# Patient Record
Sex: MALE | Race: WHITE | NOT HISPANIC OR LATINO | Employment: OTHER | URBAN - METROPOLITAN AREA
[De-identification: names, ages, dates, MRNs, and addresses within clinical notes are randomized per-mention and may not be internally consistent; named-entity substitution may affect disease eponyms.]

---

## 2018-07-10 PROBLEM — R04.0 ANTERIOR EPISTAXIS: Status: ACTIVE | Noted: 2018-07-10

## 2023-11-03 PROBLEM — I10 PRIMARY HYPERTENSION: Status: ACTIVE | Noted: 2023-11-03

## 2023-11-03 PROBLEM — I1A.0 RESISTANT HYPERTENSION: Status: ACTIVE | Noted: 2023-11-03

## 2023-11-28 ENCOUNTER — TELEPHONE (OUTPATIENT)
Dept: HEALTH INFORMATION MANAGEMENT | Facility: OTHER | Age: 48
End: 2023-11-28

## 2024-01-16 ENCOUNTER — OFFICE VISIT (OUTPATIENT)
Dept: VASCULAR LAB | Facility: MEDICAL CENTER | Age: 49
End: 2024-01-16
Attending: FAMILY MEDICINE
Payer: COMMERCIAL

## 2024-01-16 VITALS
WEIGHT: 236 LBS | HEART RATE: 77 BPM | BODY MASS INDEX: 35.77 KG/M2 | SYSTOLIC BLOOD PRESSURE: 120 MMHG | DIASTOLIC BLOOD PRESSURE: 79 MMHG | HEIGHT: 68 IN

## 2024-01-16 DIAGNOSIS — E78.49 OTHER HYPERLIPIDEMIA: ICD-10-CM

## 2024-01-16 DIAGNOSIS — I1A.0 RESISTANT HYPERTENSION: ICD-10-CM

## 2024-01-16 DIAGNOSIS — I65.23 CAROTID ATHEROSCLEROSIS, BILATERAL: ICD-10-CM

## 2024-01-16 DIAGNOSIS — I67.9 SMALL VESSEL DISEASE, CEREBROVASCULAR: Chronic | ICD-10-CM

## 2024-01-16 DIAGNOSIS — I63.81 LACUNAR STROKE (HCC): Chronic | ICD-10-CM

## 2024-01-16 DIAGNOSIS — K76.0 FATTY LIVER: ICD-10-CM

## 2024-01-16 PROCEDURE — 99204 OFFICE O/P NEW MOD 45 MIN: CPT | Performed by: FAMILY MEDICINE

## 2024-01-16 PROCEDURE — 99212 OFFICE O/P EST SF 10 MIN: CPT

## 2024-01-16 PROCEDURE — 3074F SYST BP LT 130 MM HG: CPT | Performed by: FAMILY MEDICINE

## 2024-01-16 PROCEDURE — 3078F DIAST BP <80 MM HG: CPT | Performed by: FAMILY MEDICINE

## 2024-01-16 RX ORDER — LOSARTAN POTASSIUM 100 MG/1
TABLET ORAL
COMMUNITY
Start: 2023-12-12 | End: 2024-01-16

## 2024-01-16 RX ORDER — TELMISARTAN 80 MG/1
80 TABLET ORAL
Qty: 90 TABLET | Refills: 3 | Status: SHIPPED | OUTPATIENT
Start: 2024-01-16

## 2024-01-16 RX ORDER — HYDRALAZINE HYDROCHLORIDE 25 MG/1
25 TABLET, FILM COATED ORAL 3 TIMES DAILY
Qty: 90 TABLET | Refills: 5 | Status: SHIPPED | OUTPATIENT
Start: 2024-01-16 | End: 2024-03-07

## 2024-01-16 RX ORDER — SPIRONOLACTONE 25 MG/1
25 TABLET ORAL DAILY
Qty: 30 TABLET | Refills: 3 | Status: SHIPPED | OUTPATIENT
Start: 2024-01-16

## 2024-01-16 RX ORDER — ROSUVASTATIN CALCIUM 20 MG/1
20 TABLET, COATED ORAL EVERY EVENING
Qty: 90 TABLET | Refills: 3 | Status: SHIPPED | OUTPATIENT
Start: 2024-01-16

## 2024-01-16 ASSESSMENT — ENCOUNTER SYMPTOMS
ORTHOPNEA: 0
CHILLS: 0
WHEEZING: 0
COUGH: 0
CLAUDICATION: 0
FEVER: 0
PND: 0
SHORTNESS OF BREATH: 0
HEMOPTYSIS: 0
PALPITATIONS: 0
SPUTUM PRODUCTION: 0

## 2024-01-16 ASSESSMENT — FIBROSIS 4 INDEX: FIB4 SCORE: 1.45

## 2024-01-16 NOTE — PROGRESS NOTES
RESISTANT HTN CLINIC - INITIAL EVALUATION   24    Chacho Salas is a male seen for eval/mgmt of HTN, est 2024  Chacho Salas is initially referred by Susie Chavez MD (PCP)    Subjective      HTN:  Home BP los/100s - using good machine   Adherence/tolerance to current HTN meds: >90% adherence, tolerating all meds as Rx'd  Initial visit hx:  seen by PCP 2023 and noted to have ongoing HTN despite current tx, here to review additional options.  No current sx. Has been seen routinely with med titration over last 2 months.  Subsequently seen at [facility] by APRN on 23 who initiated a secondary HTN w/u including labs and KATIE duplex.  Requested ETT and CACS.  Had hydralazine reduced to 25mg TID and continued on all other meds.     LIFESTYLE MGMT:  Weight Change: stable   Body mass index is 35.88 kg/m².   Wt Readings from Last 3 Encounters:   24 107 kg (236 lb)   23 108 kg (238 lb 1.6 oz)   11/15/23 107 kg (235 lb)      Diet pattern: common adult  Daily salt intake estimate:  Moderate     EtOH: see SHx for details - prior heavy drinking, now 2d/week with binge >3-4 drinks/setting   Exercise habits: moderate regular exercise program at work   Perceived barriers to care: none   PERTINENT HTN PMHX  Age at HTN dx: 12  Past HTN medications: multiple changes over time   HTN crises:  2017 - acute onset lacunar CVA with HTN emergency   Interfering substances/contributing factors:  None    Hyperlipidemia:  Stable, no current concerns  Current treatment: lifestyle changes  and Moderate intensity simva 20mg     Antiplatelet/anticoagulation: Yes, Details: ASA , no bleeding noted     Type 2 DM: No     CKD: No     Sleeping disorder/DANNIELLE: Yes, Details: uncontrolled, unable to tolerated CPAP. No longer using.  Reports prior severe, still snoring and daytime somnolence, STOP BANG >4pts      Hypothyroidism: No     Patient Active Problem List    Diagnosis Date Noted    Fatty liver 2024     "Small vessel disease, cerebrovascular 01/16/2024    Carotid atherosclerosis, bilateral 01/16/2024    Primary hypertension 11/03/2023    Resistant hypertension 11/03/2023    Anterior epistaxis 07/10/2018    Lacunar stroke (HCC) 03/29/2016    Hypertensive emergency 03/17/2016     Past Surgical History:   Procedure Laterality Date    ACHILLES TENDON REPAIR      KNEE ARTHROSCOPY         Current Outpatient Medications:     telmisartan, 80 mg, Oral, QHS    spironolactone, 25 mg, Oral, DAILY    hydrALAZINE, 25 mg, Oral, TID    rosuvastatin, 20 mg, Oral, Q EVENING    amLODIPine, 5 mg, Oral, DAILY, Taking    hydroCHLOROthiazide, 25 mg, Oral, DAILY, Taking    aspirin EC, 81 mg, Oral, DAILY, Taking   Ace inhibitors and Chlorthalidone    History reviewed. No pertinent family history.  Social History     Tobacco Use    Smoking status: Never    Smokeless tobacco: Never   Vaping Use    Vaping Use: Never used   Substance Use Topics    Alcohol use: Not Currently     Comment: 3-4 days a week    Drug use: Yes     Comment: THC      Review of Systems   Constitutional:  Negative for chills, fever and malaise/fatigue.   Respiratory:  Negative for cough, hemoptysis, sputum production, shortness of breath and wheezing.    Cardiovascular:  Negative for chest pain, palpitations, orthopnea, claudication, leg swelling and PND.   Neurological:  Negative for dizziness, tremors, focal weakness, seizures, weakness and headaches.       Objective    Vitals:    01/16/24 0905   BP: 120/79   BP Location: Left arm   Patient Position: Sitting   BP Cuff Size: Large adult   Pulse: 77   Weight: 107 kg (236 lb)   Height: 1.727 m (5' 8\")         Body mass index is 35.88 kg/m².  BP Readings from Last 5 Encounters:   01/16/24 120/79   12/13/23 124/72   12/12/23 127/75   11/29/23 (!) 160/108   11/15/23 (!) 168/100      Physical Exam  Vitals reviewed.   Constitutional:       General: He is not in acute distress.     Appearance: He is well-developed. He is not " "diaphoretic.   HENT:      Head: Normocephalic and atraumatic.   Neck:      Thyroid: No thyromegaly.      Vascular: No JVD.   Cardiovascular:      Rate and Rhythm: Normal rate and regular rhythm.      Pulses: Normal pulses.      Heart sounds: No murmur heard.  Pulmonary:      Effort: No respiratory distress.      Breath sounds: Normal breath sounds. No wheezing or rales.   Musculoskeletal:         General: No swelling or tenderness.      Right lower leg: No edema.      Left lower leg: No edema.   Neurological:      General: No focal deficit present.      Mental Status: He is oriented to person, place, and time.      Cranial Nerves: No cranial nerve deficit.      Coordination: Coordination normal.   Psychiatric:         Mood and Affect: Mood normal.         Behavior: Behavior normal.        Accessory Clinical Findings:     Lab Results   Component Value Date    CHOLSTRLTOT 163 11/28/2023    LDL 99 11/28/2023    HDL 40 11/28/2023    TRIGLYCERIDE 120 11/28/2023      No results found for: \"LIPOPROTA\"   No results found for: \"APOB\"           Lab Results   Component Value Date    SODIUM 137 12/12/2023    POTASSIUM 3.8 12/12/2023    CHLORIDE 102 12/12/2023    CO2 24 12/12/2023    GLUCOSE 88 12/12/2023    BUN 19 12/12/2023    CREATININE 1.0 12/12/2023       Latest Reference Range & Units 09/21/23 08:11   Cortisol ug/dL 6.6   TSH 0.47 - 4.68 uIU/mL 2.12   Free T-4 0.78 - 2.19 ng/dL 1.37            No results found for: \"MICROALBCALC\", \"MALBCRT\", \"MALBEXCR\", \"APHZUK46\", \"MICROALBUR\", \"MICRALB\", \"UMICROALBUM\", \"MICROALBTIM\"     VASCULAR IMAGING:    Carotid 2016  Minimal atherosclerotic disease is noted bilaterally, resulting in no hemodynamically significant carotid stenosis by sonographic criteria.     CT a/p 3/2023   Fatty liver.     CTA neck/head 5/2023  There is minimal atherosclerotic calcification involving carotid bulbs bilaterally.   Otherwise unremarkable CT angiogram of the neck, and [place].   Incidental note " is made of irregularity of vertebral endplates and disc space narrowing, from C5 through C7, which could represent a manifestation of previous trauma or infection.  Correlation with clinical history is suggested.    MR brain 6/2023  Remote lacunar infarction left basal ganglia.   5 mm focus of T2 hyperintensity involving right frontal deep white matter is most compatible with focal demyelination resulting from chronic microvascular atherosclerotic disease.    KATIE duplex 1/2024  Normal renal arterial study without evidence for renal artery stenosis.     Echo 1/2024        MEDICAL DECISION-MAKING:  TODAY'S ASSESSMENT / STATUS / PLAN:  1. Resistant hypertension  ALDOSTERONE    RENIN PLASMA    METANEPHRINES PLASMA    telmisartan (MICARDIS) 80 MG Tab    spironolactone (ALDACTONE) 25 MG Tab    hydrALAZINE (APRESOLINE) 25 MG Tab      2. Lacunar stroke (HCC)      per MR 6/2023 = Left basal ganglia      3. Small vessel disease, cerebrovascular      per MR 6/2023      4. Carotid atherosclerosis, bilateral        5. Fatty liver        6. Other hyperlipidemia  rosuvastatin (CRESTOR) 20 MG Tab         Patient Type: Primary Prevention, could consider secondary due to lacunar infarct    1. BLOOD PRESSURE CONTROL   Qualifies as Resistant HTN (RH):  No, not on optimized, max-dosed or max-tolerated meds from 3 classes   Office BP Goal ACC/AHA (2017) goal <130/80  Home BP at goal:  no  Office BP at goal:  no  24h ABPM:  not ordered to date   RDN candidate? YES  - stage 2, early age onset, has secondary w/u initiate   - recommend to allow our HTN specialty clinic to manage BP med adjustments to avoid excessive change and/or confusion with tx plan   Plan:   Monitoring:   - start/continue home BP monitoring, reviewed correct technique:  Yes   - order 24h ABPM: UNDECIDED  - monitor lytes/gfr routinely   - advised that stabilizing BP may require multiple med changes and close monitoring over the next several months, reviewed that initially  there will be additional imaging and labs and the possibility of adverse drug rxn's and variability of his BP and HR until we have things stabilized.  Advised to contact our office as needed for questions or concerns.      2. WORK UP FOR SECONDARY CAUSES OF RH:  Obstructive Sleep Apnea:  uncontrolled, failed CPAP  - advised of importance for tx, suggested f/u with PCP or sleep MD to review INSPIRE implant   - tx will modestly lower SBP and should be part of tx plan   Plan: see PCP or sleep MD for further eval     Renal parenchymal disease: Excluded  Renovascular HTN: Excluded    Primary Aldosteronism: pending labs, had severe hypoK+ response to higher potency thiazide, may have evidence for PA,    Plan: get labs to calc ARR then consider CT adrenal, start spirono after labs     Thyroid Function: Excluded  Pheochromocytoma: Not Yet Assessed   Cushing's:   Excluded   Coarctation of Aorta: excluded per echo   Other:    1) obesity - strong contributor to HTN     Recommendations At This Visit: as noted in orders         3. MEDICATION USE / ADHERENCE:   Assessment: Complete  Recommendations: Instructed to Continue Taking All Medications As Prescribed         4. HTN-MEDIATED END-ORGAN DAMAGE:  Left Ventricular Hypertrophy: Absent on  Echocardiogram Date: 2024  Urine Albuminuria: Not Yet Assessed on Date: ordered   Kidney function: G1-2 at worst   Ophthalmologic: Absent per patient report and/or eye specialist   Established Cardiovascular Disease:     # small vessel CeVD with lacunar infarct, left BG per MR brain 6/2023 - unclear chronicity, no sx.   2017 - acute L BG lacunar infarction with R hemiparesis, resolved   -presumed HTN-mediated, increased risk for future events   Plan: continue med mgmt, monitor for new acute focal neuro s/s and seek prompt attention     5. LIFESTYLE INTERVENTIONS:    SMOKING:    reports that he has never smoked. He has never used smokeless tobacco.   - continued complete avoidance of all  tobacco products     PHYSICAL ACTIVITY: continue healthy activity to improve CV fitness.  In general, targeting >150min/week of moderate-level activity or as much as tolerated in light of functional status and co-morbidities     WEIGHT MANAGEMENT AND NUTRITION: DASH style dietary approach , Focus on reduced sodium to <2,000mg per day , and Provide specific dietary approach handouts     6. STANDARD HTN PHARMACOTHERAPY: monitor lytes/gfr with med changes   - stop valsartan, start telmisartan 80mg for improved potency and duration   - start amlodipine 5mg QHS - likely needs 10mg   - continue HCTZ 25mg daily (max dosing, chlorthalidone led to severe hypoK+)     7. ADDITIONAL AGENTS   - start spironolactone 25mg daily after labs   - add long-acting periph alpha next   - add carvedilol or nebivolol as subsequent   - reduce hydralazine to 25mg TID - pt has been unable to adhere to TID dosing, likely will titrate down and/off for preference of other med classes, would reserve this for 6th or 7th agent.  Generally high dose hydralazine necessitates BB and loop to reduce reflux tachy and edema from direct vasodilt effect    8. Renal denervation therapy  - minimally invasive endovasc procedure targeting sympathetic nerves adjacent to renal aa  - current options include [employer] [place] uRDN system (may be available 2024 at [facility]) or [employer] (not currently available)  - patient selection (for [place] uRDN): uncontrolled HTN, 21+years, eGFR >40, no renal artery stenosis  RDN candidate? Yes - if remains uncontrolled   Plan: will keep on candidate list for now and revisit if/when commercially available to our area     LIPID MANAGEMENT:   Qualifies for Statin Therapy Based on 2018 ACC/AHA Guidelines: yes, Secondary prevention - <76yo, ASCVD not at very high risk  The ASCVD Risk score ([place] DK, et al., 2019) failed to calculate., N/A  Major ASCVD events: History of ischemic CVA   (though lacunar)  High-risk conditions:  Hypertension   Currently on Statin: Yes - should have high potency   Tx goals: LDL-C <70   At goal? no  Plan:   - reinforced ongoing TLC measures as noted   - monitor labs   Meds:   - stop simva, start rosuva 20mg daily (high potency)     GLYCEMIA MANAGEMENT:  Normal, at risk     ANTIPLATELET/ANTICOAGULANT THERAPY:  continue ASA 81mg daily     OTHER ISSUES: none     Studies Ordered At This Visit: none    Labs to Be Obtained Prior to Next Visit: if ordered, as noted in assessment  Disposition: RTC in 6 weeks    Denzel Francis M.D.  [facility]   [facility]   [phone]

## 2024-01-17 ASSESSMENT — ENCOUNTER SYMPTOMS
TREMORS: 0
HEADACHES: 0
FOCAL WEAKNESS: 0
SEIZURES: 0
WEAKNESS: 0
DIZZINESS: 0

## 2024-01-24 ENCOUNTER — TELEPHONE (OUTPATIENT)
Dept: SCHEDULING | Facility: IMAGING CENTER | Age: 49
End: 2024-01-24

## 2024-01-24 NOTE — TELEPHONE ENCOUNTER
KRYSTA    Caller: Chacho Salas     Topic/issue: Pt states since starting the medications he is having irregular heartbeat and palpitations. He is feeling fine other than that.  He thinks he is having a reaction to them. No elevated heart rate    Callback Number: [phone] (home)      Thank You    Elizabeth PRATHER

## 2024-01-26 ENCOUNTER — TELEPHONE (OUTPATIENT)
Dept: VASCULAR LAB | Facility: MEDICAL CENTER | Age: 49
End: 2024-01-26
Payer: COMMERCIAL

## 2024-01-26 DIAGNOSIS — R79.89 ELEVATED PLASMA METANEPHRINES: ICD-10-CM

## 2024-01-26 DIAGNOSIS — I1A.0 RESISTANT HYPERTENSION: ICD-10-CM

## 2024-01-26 NOTE — TELEPHONE ENCOUNTER
Phone Number Called: [phone] (home)       Call outcome: Spoke to patient regarding message below.    Message: pt understands urine study advised it will be sent via [employer], says he was having some heart palpitations and irregular heart beat. Pt says it felt like a bird was moving around in his chest. Says as of today these symptoms have subsided but wanted to make sure this was nothing serious.    Martha Daniels, Medical Ass't  [facility]   Phone: [phone]   Fax: [phone]

## 2024-01-26 NOTE — TELEPHONE ENCOUNTER
MA - please contact to inform the adrenaline byproducts (metanephrines and normetanephrines) are slightly elevated.  As this is only a screening test, we will need to complete further testing with a 24 hour urine study.    Please have him complete and review the preparation steps from this weblink:      [email or web address]    Can share link with him via portal if needed.  Thanks

## 2024-03-01 ENCOUNTER — TELEPHONE (OUTPATIENT)
Dept: VASCULAR LAB | Facility: MEDICAL CENTER | Age: 49
End: 2024-03-01
Payer: COMMERCIAL

## 2024-03-01 DIAGNOSIS — I1A.0 RESISTANT HYPERTENSION: ICD-10-CM

## 2024-03-01 NOTE — TELEPHONE ENCOUNTER
Established patient  Chart prep for upcoming appointment with Dr. Francis    Any pending/incomplete orders from last visit? Yes Labs, patient reminded to try to complete prior to appointment  Were any records requested?  No  Correct appointment type (New/Established/virtual)? Yes  Referral up to date? Yes  Referral attached to appointment (renewals and New patients only)? N/A (established with up-to-date referral)

## 2024-03-07 ENCOUNTER — OFFICE VISIT (OUTPATIENT)
Dept: VASCULAR LAB | Facility: MEDICAL CENTER | Age: 49
End: 2024-03-07
Attending: FAMILY MEDICINE
Payer: COMMERCIAL

## 2024-03-07 VITALS
SYSTOLIC BLOOD PRESSURE: 105 MMHG | BODY MASS INDEX: 35.28 KG/M2 | WEIGHT: 232 LBS | DIASTOLIC BLOOD PRESSURE: 68 MMHG | HEART RATE: 75 BPM

## 2024-03-07 DIAGNOSIS — I1A.0 RESISTANT HYPERTENSION: ICD-10-CM

## 2024-03-07 DIAGNOSIS — K76.0 FATTY LIVER: ICD-10-CM

## 2024-03-07 DIAGNOSIS — I63.81 LACUNAR STROKE (HCC): ICD-10-CM

## 2024-03-07 DIAGNOSIS — I51.89 GRADE I DIASTOLIC DYSFUNCTION: ICD-10-CM

## 2024-03-07 DIAGNOSIS — R79.89 ELEVATED PLASMA METANEPHRINES: Chronic | ICD-10-CM

## 2024-03-07 DIAGNOSIS — E78.49 OTHER HYPERLIPIDEMIA: ICD-10-CM

## 2024-03-07 DIAGNOSIS — I67.9 SMALL VESSEL DISEASE, CEREBROVASCULAR: ICD-10-CM

## 2024-03-07 PROCEDURE — 3078F DIAST BP <80 MM HG: CPT | Performed by: FAMILY MEDICINE

## 2024-03-07 PROCEDURE — 3074F SYST BP LT 130 MM HG: CPT | Performed by: FAMILY MEDICINE

## 2024-03-07 PROCEDURE — 99212 OFFICE O/P EST SF 10 MIN: CPT

## 2024-03-07 PROCEDURE — 99214 OFFICE O/P EST MOD 30 MIN: CPT | Performed by: FAMILY MEDICINE

## 2024-03-07 RX ORDER — AMLODIPINE BESYLATE 10 MG/1
10 TABLET ORAL
Qty: 90 TABLET | Refills: 3 | Status: SHIPPED | OUTPATIENT
Start: 2024-03-07

## 2024-03-07 ASSESSMENT — ENCOUNTER SYMPTOMS
WEAKNESS: 0
SEIZURES: 0
SHORTNESS OF BREATH: 0
HEADACHES: 0
PALPITATIONS: 0
SPUTUM PRODUCTION: 0
CLAUDICATION: 0
WHEEZING: 0
PND: 0
CHILLS: 0
FOCAL WEAKNESS: 0
HEMOPTYSIS: 0
ORTHOPNEA: 0
COUGH: 0
TREMORS: 0
FEVER: 0
DIZZINESS: 0

## 2024-03-07 ASSESSMENT — FIBROSIS 4 INDEX: FIB4 SCORE: 1.48

## 2024-03-07 NOTE — PROGRESS NOTES
[facility] - initial f/u  24    Chacho Salas is a male seen for eval/mgmt of HTN, est 2024  Chacho Salas is initially referred by Susie Chavez MD (PCP)    Subjective    Interval hx/concerns: last seen 2024, feeling well, no new sx. Tolerating all med changes.  Did not complete 24h urine metaneph studies. Denies flushing, HA, palpitations, dizz.      HTN:  Home BP los/70-80s   Adherence/tolerance to current HTN meds: >90% adherence, tolerating all meds as Rx'd  Initial visit hx:  seen by PCP 2023 and noted to have ongoing HTN despite current tx, here to review additional options.  No current sx. Has been seen routinely with med titration over last 2 months.  Subsequently seen at [facility] by APRN on 23 who initiated a secondary HTN w/u including labs and KATIE duplex.  Requested ETT and CACS.  Had hydralazine reduced to 25mg TID and continued on all other meds.     LIFESTYLE MGMT:  Weight Change: mild reduction   Body mass index is 35.28 kg/m².   Wt Readings from Last 3 Encounters:   24 105 kg (232 lb)   24 107 kg (236 lb)   23 108 kg (238 lb 1.6 oz)    Diet pattern: adjusting to DASH  Daily salt intake estimate:  Low     EtOH: see SHx for details - prior heavy drinking, now 2d/week with binge >3-4 drinks/setting   Exercise habits: moderate regular exercise program at work   Perceived barriers to care: none   PERTINENT HTN PMHX  Age at HTN dx: 12  Past HTN medications: multiple changes over time   HTN crises:  2017 - acute onset lacunar CVA with HTN emergency   Interfering substances/contributing factors:  None    Hyperlipidemia: Stable, no current concerns, Current treatment: lifestyle changes  and Moderate intensity simva 20mg     Antiplatelet/anticoagulation: Yes, Details: ASA , no bleeding noted         Current Outpatient Medications:     amLODIPine, 10 mg, Oral, QHS    hydroCHLOROthiazide, 25 mg, Oral, QDAY, Taking    telmisartan, 80 mg, Oral,  QHS, Taking    spironolactone, 25 mg, Oral, DAILY, Taking    rosuvastatin, 20 mg, Oral, Q EVENING, Taking    aspirin EC, 81 mg, Oral, DAILY, Taking     Allergies   Allergen Reactions    Ace Inhibitors Cough    Chlorthalidone      Hypokalemia = 2.9, tolerating HCTZ     Social History     Tobacco Use    Smoking status: Never    Smokeless tobacco: Never   Vaping Use    Vaping Use: Never used   Substance Use Topics    Alcohol use: Not Currently     Comment: 3-4 days a week    Drug use: Yes     Comment: THC      Review of Systems   Constitutional:  Negative for chills, fever and malaise/fatigue.   Respiratory:  Negative for cough, hemoptysis, sputum production, shortness of breath and wheezing.    Cardiovascular:  Negative for chest pain, palpitations, orthopnea, claudication, leg swelling and PND.   Neurological:  Negative for dizziness, tremors, focal weakness, seizures, weakness and headaches.       Objective    Vitals:    03/07/24 0845   BP: 105/68   BP Location: Left arm   Patient Position: Sitting   BP Cuff Size: Large adult   Pulse: 75   Weight: 105 kg (232 lb)     Body mass index is 35.28 kg/m².  BP Readings from Last 5 Encounters:   03/07/24 105/68   01/16/24 120/79   12/13/23 124/72   12/12/23 127/75   11/29/23 (!) 160/108      Physical Exam  Vitals reviewed.   Constitutional:       General: He is not in acute distress.     Appearance: He is well-developed. He is not diaphoretic.   HENT:      Head: Normocephalic and atraumatic.   Neck:      Thyroid: No thyromegaly.      Vascular: No JVD.   Cardiovascular:      Rate and Rhythm: Normal rate and regular rhythm.      Pulses: Normal pulses.      Heart sounds: No murmur heard.  Pulmonary:      Effort: No respiratory distress.      Breath sounds: Normal breath sounds. No wheezing or rales.   Musculoskeletal:         General: No swelling or tenderness.      Right lower leg: No edema.      Left lower leg: No edema.   Neurological:      General: No focal deficit present.  "     Mental Status: He is oriented to person, place, and time.      Cranial Nerves: No cranial nerve deficit.      Coordination: Coordination normal.   Psychiatric:         Mood and Affect: Mood normal.         Behavior: Behavior normal.      Accessory Clinical Findings:     Lab Results   Component Value Date    CHOLSTRLTOT 163 11/28/2023    LDL 99 11/28/2023    HDL 40 11/28/2023    TRIGLYCERIDE 120 11/28/2023      No results found for: \"LIPOPROTA\"   No results found for: \"APOB\"           Lab Results   Component Value Date    SODIUM 137 12/12/2023    POTASSIUM 3.8 12/12/2023    CHLORIDE 102 12/12/2023    CO2 24 12/12/2023    GLUCOSE 88 12/12/2023    BUN 19 12/12/2023    CREATININE 1.0 12/12/2023       Latest Reference Range & Units 09/21/23 08:11   Cortisol ug/dL 6.6   TSH 0.47 - 4.68 uIU/mL 2.12   Free T-4 0.78 - 2.19 ng/dL 1.37      Latest Reference Range & Units 01/17/24 08:25   Aldos Serum ng/dL 6   Renin Activity 0.25 - 5.82 ng/mL/h 1.88     Labs 1/17/24  METANEPHRINE, FREE      43               pg/mL        < OR = 57   NORMETANEPHRINE, FREE   185           H  pg/mL        < OR = 148   TOTAL, FREE (MN+NMN)    228           H  pg/mL        < OR = 205     VASCULAR IMAGING:    Carotid 2016  Minimal atherosclerotic disease is noted bilaterally, resulting in no hemodynamically significant carotid stenosis by sonographic criteria.     CT a/p 3/2023   Fatty liver.     CTA neck/head 5/2023  There is minimal atherosclerotic calcification involving carotid bulbs bilaterally.   Otherwise unremarkable CT angiogram of the neck, and [place].   Incidental note is made of irregularity of vertebral endplates and disc space narrowing, from C5 through C7, which could represent a manifestation of previous trauma or infection.  Correlation with clinical history is suggested.    MR brain 6/2023  Remote lacunar infarction left basal ganglia.   5 mm focus of T2 hyperintensity involving right frontal deep white matter is most " compatible with focal demyelination resulting from chronic microvascular atherosclerotic disease.    KATIE duplex 1/2024  Normal renal arterial study without evidence for renal artery stenosis.     Echo 1/2024        MEDICAL DECISION-MAKING:  TODAY'S ASSESSMENT / STATUS / PLAN:  1. Resistant hypertension  amLODIPine (NORVASC) 10 MG Tab    METANEPHRINES PLASMA    Basic Metabolic Panel      2. Lacunar stroke (HCC)        3. Elevated plasma metanephrines      <1xULN, does not suggest pheo      4. Small vessel disease, cerebrovascular        5. Fatty liver        6. Other hyperlipidemia        7. Grade I diastolic dysfunction           Patient Type: Primary Prevention, could consider secondary due to lacunar infarct    1. BLOOD PRESSURE CONTROL   Qualifies as Resistant HTN (RH):  No, not on optimized, max-dosed or max-tolerated meds from 3 classes   Office BP Goal ACC/AHA (2017) goal <130/80  Home BP at goal: yes  Office BP at goal:  yes  24h ABPM:  not ordered to date   - stage 2, early age onset  - recommend to allow our HTN specialty clinic to manage BP med adjustments to avoid excessive changes and/or confusion with tx plan   Plan:   Monitoring:   - start/continue home BP monitoring, reviewed correct technique:  Yes   - order 24h ABPM: UNDECIDED  - monitor lytes/gfr routinely     2. WORK UP FOR SECONDARY CAUSES OF RH:  Obstructive Sleep Apnea:  likely high contributor to mild high metaneph values   uncontrolled, unable to tolerated CPAP. No longer using.  Reports prior severe, still snoring and daytime somnolence, STOP BANG >4pts   - advised of importance for tx, suggested f/u with PCP or sleep MD to review INSPIRE implant   - tx will modestly lower SBP and should be part of tx plan   Plan: see PCP or sleep MD for further eval     Renal parenchymal disease: Excluded  Renovascular HTN: Excluded    Primary Aldosteronism: low prob, non-suppressed renin, aretha <10, ARR low     Thyroid Function:  Excluded    Pheochromocytoma:  minimal high total plasma <1xULN does not suggest pheo, but likely sympathetic-driven HTN, no further w/u at this time       Cushing's:   Excluded   Coarctation of Aorta: excluded per echo   Other:    1) obesity - strong contributor to HTN, continued wt loss         3. MEDICATION USE / ADHERENCE:   Assessment: Complete  Recommendations: Instructed to Continue Taking All Medications As Prescribed         4. HTN-MEDIATED END-ORGAN DAMAGE:  Left Ventricular Hypertrophy: Absent on  Echocardiogram Date: 2024  Urine Albuminuria: Not Yet Assessed on Date: ordered   Kidney function: G1-2 at worst   Ophthalmologic: Absent per patient report   Established Cardiovascular Disease:     # small vessel CeVD with lacunar infarct, left BG per MR brain 6/2023 - unclear chronicity, no sx.   2017 - acute L BG lacunar infarction with R hemiparesis, resolved   -presumed HTN-mediated, increased risk for future events   Plan: continue med mgmt, monitor for new acute focal neuro s/s and seek prompt attention     5. LIFESTYLE INTERVENTIONS:    SMOKING:    reports that he has never smoked. He has never used smokeless tobacco.   - continued complete avoidance of all tobacco products     PHYSICAL ACTIVITY: continue healthy activity to improve CV fitness.  In general, targeting >150min/week of moderate-level activity or as much as tolerated in light of functional status and co-morbidities     WEIGHT MANAGEMENT AND NUTRITION: DASH style dietary approach , Focus on reduced sodium to <2,000mg per day , and Provide specific dietary approach handouts     6. STANDARD HTN PHARMACOTHERAPY: monitor lytes/gfr with med changes   - continue telmisartan 80mg, prior on valsartan   - increase amlodipine to 10mg daily   - continue HCTZ 25mg daily (max dosing, chlorthalidone led to severe hypoK+)     7. ADDITIONAL AGENTS   - continue spironolactone 25mg daily, increase   - add carvedilol or nebivolol as subsequent due to  sympath-driven HTN  - add guanfacine QHS if 6th agent needed   - consider long-acting periph alpha next due to sympath-driven HTN   - stop hydralazine (prior on 25mg TID, only taking BID or intermittently)     8. Renal denervation therapy  - minimally invasive endovasc procedure targeting sympathetic nerves adjacent to renal aa  - current options include [employer] [place] uRDN system (may be available 2024 at [facility]) or [employer] Symplicity (not currently available)  - patient selection (for [place] uRDN): uncontrolled HTN, 21+years, eGFR >40, no renal artery stenosis  RDN candidate? Yes - if remains uncontrolled   Plan: will keep on candidate list for now and revisit if/when commercially available to our area     LIPID MANAGEMENT:   Qualifies for Statin Therapy Based on 2018 ACC/AHA Guidelines: yes, Secondary prevention - <74yo, ASCVD not at very high risk  The ASCVD Risk score (Mana ZEPEDA, et al., 2019) failed to calculate., N/A  Major ASCVD events: History of ischemic CVA   (though lacunar)  High-risk conditions: Hypertension   Currently on Statin: Yes - should have high potency   Tx goals: LDL-C <70   At goal? No, 11/2023   Plan:   - reinforced ongoing TLC measures as noted   - monitor labs   Meds:   - continue rosuva 20mg daily, prior on simva     GLYCEMIA MANAGEMENT:  Normal, at risk     ANTIPLATELET/ANTICOAGULANT THERAPY:  continue ASA 81mg daily     OTHER ISSUES: none     Studies Ordered At This Visit: none    Labs to Be Obtained Prior to Next Visit: if ordered, as noted in assessment  Disposition: RTC in 8 weeks    Denzel Francis M.D.  [facility]   [facility]   [phone]

## 2024-04-11 DIAGNOSIS — I1A.0 RESISTANT HYPERTENSION: ICD-10-CM

## 2024-04-11 RX ORDER — SPIRONOLACTONE 25 MG/1
25 TABLET ORAL
Qty: 100 TABLET | Refills: 3 | Status: SHIPPED | OUTPATIENT
Start: 2024-04-11

## 2024-05-09 ENCOUNTER — OFFICE VISIT (OUTPATIENT)
Dept: VASCULAR LAB | Facility: MEDICAL CENTER | Age: 49
End: 2024-05-09
Payer: COMMERCIAL

## 2024-05-09 VITALS
HEART RATE: 76 BPM | HEIGHT: 68 IN | SYSTOLIC BLOOD PRESSURE: 108 MMHG | BODY MASS INDEX: 35.01 KG/M2 | WEIGHT: 231 LBS | DIASTOLIC BLOOD PRESSURE: 76 MMHG

## 2024-05-09 DIAGNOSIS — R06.83 SNORING: ICD-10-CM

## 2024-05-09 DIAGNOSIS — E78.49 OTHER HYPERLIPIDEMIA: ICD-10-CM

## 2024-05-09 DIAGNOSIS — I1A.0 RESISTANT HYPERTENSION: ICD-10-CM

## 2024-05-09 DIAGNOSIS — F41.9 ANXIETY: ICD-10-CM

## 2024-05-09 DIAGNOSIS — I65.23 CAROTID ATHEROSCLEROSIS, BILATERAL: ICD-10-CM

## 2024-05-09 DIAGNOSIS — R00.2 PALPITATIONS: ICD-10-CM

## 2024-05-09 PROCEDURE — 99214 OFFICE O/P EST MOD 30 MIN: CPT

## 2024-05-09 PROCEDURE — 3078F DIAST BP <80 MM HG: CPT

## 2024-05-09 PROCEDURE — 3074F SYST BP LT 130 MM HG: CPT

## 2024-05-09 ASSESSMENT — ENCOUNTER SYMPTOMS
CHILLS: 0
HEADACHES: 0
DOUBLE VISION: 0
WEAKNESS: 0
BRUISES/BLEEDS EASILY: 0
DIZZINESS: 0
SEIZURES: 0
TREMORS: 0
COUGH: 0
SHORTNESS OF BREATH: 0
BLOOD IN STOOL: 0
PALPITATIONS: 1
BLURRED VISION: 0
FOCAL WEAKNESS: 0
FEVER: 0

## 2024-05-09 ASSESSMENT — FIBROSIS 4 INDEX: FIB4 SCORE: 1.48

## 2024-05-09 NOTE — PROGRESS NOTES
" [facility] - initial f/u  2024    Chacho Salas is a 48 yo  male seen for eval/mgmt of HTN, est 2024  Chacho Salas is initially referred by Susie Chavez MD (PCP)    Subjective    Interval hx/concerns:   last seen 3/7/2024, feeling well,   Still having ongoing \"flutters\" with heart   Is happening mostly in evenings/night.  But does occasionally happed during daytime.  No identifiable precipitating events, and no relieving factors  Typically last 5 min - hours  No changes in BPs when even occurs, typically  Does note it is likely related to ongoing stressors,   Also notes that sometimes he gets a feeling of \"dropping and freezing cold\"- has never taken bps or HR during those times  Had zio patch done at [place] with cardiology, which did not show anything in 2023  Is not sleeping well, does snore.  Had a cpap a couple years ago - had a terrible time and has not worn or followed up with sleep med since.  Prefers to not add any additional medications  Did labs yesterday - still pending some results       HTN:  Home BP los/70-80s   Adherence/tolerance to current HTN meds: >90% adherence, tolerating all meds as Rx'd  Initial visit hx:  seen by PCP 2023 and noted to have ongoing HTN despite current tx, here to review additional options.  No current sx. Has been seen routinely with med titration over last 2 months.  Subsequently seen at [facility] by APRN on 23 who initiated a secondary HTN w/u including labs and KATIE duplex.  Requested ETT and CACS.  Had hydralazine reduced to 25mg TID and continued on all other meds.     LIFESTYLE MGMT:  Weight Change: mild reduction   Body mass index is 35.12 kg/m².   Wt Readings from Last 3 Encounters:   24 105 kg (231 lb)   24 105 kg (232 lb)   24 107 kg (236 lb)    Diet pattern: adjusting to DASH  Daily salt intake estimate:  Low     EtOH: see SHx for details - prior heavy drinking, now 2d/week with binge >3-4 " "drinks/setting   Exercise habits: moderate regular exercise program at work   Perceived barriers to care: none   PERTINENT HTN PMHX  Age at HTN dx: 12  Past HTN medications: multiple changes over time   HTN crises:  2017 - acute onset lacunar CVA with HTN emergency   Interfering substances/contributing factors:  None    Hyperlipidemia: Stable, no current concerns, Current treatment: lifestyle changes  and Moderate intensity rosuva 20mg     Antiplatelet/anticoagulation: Yes, Details: ASA , no bleeding noted         Current Outpatient Medications:     spironolactone, 25 mg, Oral, QDAY, Taking    amLODIPine, 10 mg, Oral, QHS, Taking    hydroCHLOROthiazide, 25 mg, Oral, QDAY, Taking    telmisartan, 80 mg, Oral, QHS, Taking    rosuvastatin, 20 mg, Oral, Q EVENING, Taking    aspirin EC, 81 mg, Oral, DAILY, Taking     Allergies   Allergen Reactions    Ace Inhibitors Cough    Chlorthalidone      Hypokalemia = 2.9, tolerating HCTZ     Social History     Tobacco Use    Smoking status: Never    Smokeless tobacco: Never   Vaping Use    Vaping Use: Never used   Substance Use Topics    Alcohol use: Not Currently     Comment: 3-4 days a week    Drug use: Yes     Comment: THC      Review of Systems   Constitutional:  Positive for malaise/fatigue. Negative for chills and fever.   Eyes:  Negative for blurred vision and double vision.   Respiratory:  Negative for cough and shortness of breath.    Cardiovascular:  Positive for palpitations. Negative for chest pain and leg swelling.   Gastrointestinal:  Negative for blood in stool and melena.   Genitourinary:  Negative for hematuria.   Neurological:  Negative for dizziness, tremors, focal weakness, seizures, weakness and headaches.   Endo/Heme/Allergies:  Does not bruise/bleed easily.       Objective    Vitals:    05/09/24 0918   BP: 108/76   BP Location: Left arm   Patient Position: Sitting   BP Cuff Size: Large adult   Pulse: 76   Weight: 105 kg (231 lb)   Height: 1.727 m (5' 8\") " "    Body mass index is 35.12 kg/m².  BP Readings from Last 5 Encounters:   05/09/24 108/76   03/07/24 105/68   01/16/24 120/79   12/13/23 124/72   12/12/23 127/75      Physical Exam  Vitals reviewed.   Constitutional:       General: He is not in acute distress.     Appearance: He is well-developed. He is not diaphoretic.   HENT:      Head: Normocephalic and atraumatic.   Cardiovascular:      Rate and Rhythm: Normal rate and regular rhythm.      Pulses: Normal pulses.      Heart sounds: No murmur heard.  Pulmonary:      Effort: No respiratory distress.      Breath sounds: Normal breath sounds. No wheezing or rales.   Musculoskeletal:         General: No swelling.      Right lower leg: No edema.      Left lower leg: No edema.   Skin:     General: Skin is warm and dry.      Coloration: Skin is not pale.   Neurological:      General: No focal deficit present.      Mental Status: He is oriented to person, place, and time.      Cranial Nerves: No cranial nerve deficit.      Coordination: Coordination normal.      Gait: Gait normal.   Psychiatric:         Mood and Affect: Mood normal.         Behavior: Behavior normal.        Accessory Clinical Findings:     Lab Results   Component Value Date    CHOLSTRLTOT 163 11/28/2023    LDL 99 11/28/2023    HDL 40 11/28/2023    TRIGLYCERIDE 120 11/28/2023      No results found for: \"LIPOPROTA\"   No results found for: \"APOB\"           Lab Results   Component Value Date    SODIUM 138 05/08/2024    POTASSIUM 4.4 05/08/2024    CHLORIDE 103 05/08/2024    CO2 25 05/08/2024    GLUCOSE 94 05/08/2024    BUN 22 (H) 05/08/2024    CREATININE 1.1 05/08/2024       Latest Reference Range & Units 09/21/23 08:11   Cortisol ug/dL 6.6   TSH 0.47 - 4.68 uIU/mL 2.12   Free T-4 0.78 - 2.19 ng/dL 1.37      Latest Reference Range & Units 01/17/24 08:25   Aldos Serum ng/dL 6   Renin Activity 0.25 - 5.82 ng/mL/h 1.88     Labs 1/17/24  METANEPHRINE, FREE      43               pg/mL        < OR = 57 "   NORMETANEPHRINE, FREE   185           H  pg/mL        < OR = 148   TOTAL, FREE (MN+NMN)    228           H  pg/mL        < OR = 205     VASCULAR IMAGING:    Carotid 2016  Minimal atherosclerotic disease is noted bilaterally, resulting in no hemodynamically significant carotid stenosis by sonographic criteria.     CT a/p 3/2023   Fatty liver.     CTA neck/head 5/2023  There is minimal atherosclerotic calcification involving carotid bulbs bilaterally.   Otherwise unremarkable CT angiogram of the neck, and [place] [facility] [place].   Incidental note is made of irregularity of vertebral endplates and disc space narrowing, from C5 through C7, which could represent a manifestation of previous trauma or infection.  Correlation with clinical history is suggested.    MR brain 6/2023  Remote lacunar infarction left basal ganglia.   5 mm focus of T2 hyperintensity involving right frontal deep white matter is most compatible with focal demyelination resulting from chronic microvascular atherosclerotic disease.    KATIE duplex 1/2024  Normal renal arterial study without evidence for renal artery stenosis.     Echo 1/2024        MEDICAL DECISION-MAKING:  TODAY'S ASSESSMENT / STATUS / PLAN:  1. Resistant hypertension        2. Other hyperlipidemia        3. Carotid atherosclerosis, bilateral        4. Snoring        5. Anxiety        6. Palpitations             Patient Type: Primary Prevention, could consider secondary due to lacunar infarct    1. BLOOD PRESSURE CONTROL   Qualifies as Resistant HTN (RH):  No, not on optimized, max-dosed or max-tolerated meds from 3 classes   Office BP Goal ACC/AHA (2017) goal <130/80  Home BP at goal: yes  Office BP at goal:  yes  24h ABPM:  not ordered to date   - stage 2, early age onset  - recommend to allow our HTN specialty clinic to manage BP med adjustments to avoid excessive changes and/or confusion with tx plan   Long discussion today on bp management and ongoing issues with stress and anxiety as  well as DANNIELLE.  Pt resistant to adding additional medications, and suspect anxiety plays a significant roll in his palpitations due to sympathetic drive.  Reviewed stress reduction techniques, as well as recommend referral to behavioral health to help with overall stress management, and/or discussion with PCP.  Pt resistant at this time but may consider in future.    Plan:   Monitoring:   - start/continue home BP monitoring, reviewed correct technique:  Yes   - order 24h ABPM: UNDECIDED  - monitor lytes/gfr routinely     2. WORK UP FOR SECONDARY CAUSES OF RH:  Obstructive Sleep Apnea:  likely high contributor to mild high metaneph values   uncontrolled, unable to tolerate CPAP. No longer using.  Reports prior severe, still snoring and daytime somnolence, STOP BANG >4pts   - advised of importance for tx, suggested f/u with PCP or sleep MD to review INSPIRE implant - again reviewed importance  - tx will modestly lower SBP and should be part of tx plan   Plan: see PCP or sleep MD for further eval     Renal parenchymal disease: Excluded  Renovascular HTN: Excluded    Primary Aldosteronism: low prob, non-suppressed renin, aretha <10, ARR low     Thyroid Function: Excluded    Pheochromocytoma:  minimal high total plasma <1xULN does not suggest pheo, but likely sympathetic-driven HTN, no further w/u at this time       Cushing's:   Excluded   Coarctation of Aorta: excluded per echo   Other:    1) obesity - strong contributor to HTN, continued wt loss         3. MEDICATION USE / ADHERENCE:   Assessment: Complete  Recommendations: Instructed to Continue Taking All Medications As Prescribed         4. HTN-MEDIATED END-ORGAN DAMAGE:  Left Ventricular Hypertrophy: Absent on  Echocardiogram Date: 2024  Urine Albuminuria: Not Yet Assessed on Date: ordered   Kidney function: G1-2 at worst   Ophthalmologic: Absent per patient report   Established Cardiovascular Disease:     # small vessel CeVD with lacunar infarct, left BG per MR brain  6/2023 - unclear chronicity, no sx.   2017 - acute L BG lacunar infarction with R hemiparesis, resolved   -presumed HTN-mediated, increased risk for future events   Plan: continue med mgmt, monitor for new acute focal neuro s/s and seek prompt attention     5. LIFESTYLE INTERVENTIONS:    SMOKING:    reports that he has never smoked. He has never used smokeless tobacco.   - continued complete avoidance of all tobacco products     PHYSICAL ACTIVITY: continue healthy activity to improve CV fitness.  In general, targeting >150min/week of moderate-level activity or as much as tolerated in light of functional status and co-morbidities     WEIGHT MANAGEMENT AND NUTRITION: DASH style dietary approach , Focus on reduced sodium to <2,000mg per day , and Provide specific dietary approach handouts     6. STANDARD HTN PHARMACOTHERAPY: monitor lytes/gfr with med changes   - continue telmisartan 80mg, prior on valsartan   - continue amlodipine to 10mg daily   - continue HCTZ 25mg daily (max dosing, chlorthalidone led to severe hypoK+)     7. ADDITIONAL AGENTS   - continue spironolactone 25mg daily, increase   - add carvedilol or nebivolol as subsequent due to sympath-driven HTN  - add guanfacine QHS if 6th agent needed   - consider long-acting periph alpha next due to sympath-driven HTN   - stop hydralazine (prior on 25mg TID, only taking BID or intermittently)     8. Renal denervation therapy  - minimally invasive endovasc procedure targeting sympathetic nerves adjacent to renal aa  - current options include [employer] [facility] uRDN system (may be available 2024 at [facility]) or [employer] (not currently available)  - patient selection (for [facility] uRDN): uncontrolled HTN, 21+years, eGFR >40, no renal artery stenosis  RDN candidate? Yes - if remains uncontrolled   Plan: will keep on candidate list for now and revisit if/when commercially available to our area     LIPID MANAGEMENT:   Qualifies for Statin Therapy Based on 2018  "ACC/AHA Guidelines: yes, Secondary prevention - <74yo, ASCVD not at very high risk  The ASCVD Risk score (Mana ZEPEDA, et al., 2019) failed to calculate., N/A  Major ASCVD events: History of ischemic CVA   (though lacunar)  High-risk conditions: Hypertension   Currently on Statin: Yes - should have high potency   Tx goals: LDL-C <70   At goal? No, 11/2023   Plan:   - reinforced ongoing TLC measures as noted   - monitor labs - order at next appt  Meds:   - continue rosuva 20mg daily, prior on simva     GLYCEMIA MANAGEMENT:  Normal, at risk     ANTIPLATELET/ANTICOAGULANT THERAPY:  continue ASA 81mg daily     OTHER ISSUES:   Anxiety - ongoing, uncontrolled, pt with ongoing work and daily stressors which seem to be manifesting as heart \"flutters\", typically in evening or night time.  Long discussion today that anxiety and stress can impact over all health.  Reviewed nonpharmacologic stress reduction techniques, which pt states he uses sometimes but not consistently.  Briefly reviewed other treatment options including referral to behavioral health and/or discussing with PCP other treatment options including medication.  Pt resistant to referral and addition of other medications at this time.  Encouraged nonpharmacologic techniques, and follow up with PCP to help.  Consider referral to behavioral health in future.      Palpitations - ongoing, ongoing episodes of heart \"flutter\" typically occurring in evenings/nighttime, lasting 5min - hours.  No precipitating or relieving factors identified.  Had Zio patch completed by cardiology 12/2023 in [place], which was relatively unremarkable, repeating will likely not show anything different.  Long discussion with pt that suspect stress/anxiety is plays a significant factor in palpitations and learning to control and reduce stress is important to reducing occurences.  Suggest pt obtain smart watch/fit bit type device to monitor HR  and note/log any changes.  Recommend pt follow up " with PCP for further stress/anxiety management.      Studies Ordered At This Visit: none    Labs to Be Obtained Prior to Next Visit: none, order at next appt  Disposition: 3 months for bp check    HILARIO Perez.  [facility]   [facility]   [phone]

## 2024-08-20 ENCOUNTER — OFFICE VISIT (OUTPATIENT)
Dept: VASCULAR LAB | Facility: MEDICAL CENTER | Age: 49
End: 2024-08-20
Attending: INTERNAL MEDICINE
Payer: COMMERCIAL

## 2024-08-20 VITALS
BODY MASS INDEX: 35.31 KG/M2 | DIASTOLIC BLOOD PRESSURE: 79 MMHG | WEIGHT: 233 LBS | SYSTOLIC BLOOD PRESSURE: 114 MMHG | HEART RATE: 55 BPM | HEIGHT: 68 IN

## 2024-08-20 DIAGNOSIS — I1A.0 RESISTANT HYPERTENSION: ICD-10-CM

## 2024-08-20 DIAGNOSIS — I65.23 CAROTID ATHEROSCLEROSIS, BILATERAL: ICD-10-CM

## 2024-08-20 DIAGNOSIS — E78.49 OTHER HYPERLIPIDEMIA: ICD-10-CM

## 2024-08-20 DIAGNOSIS — I63.81 LACUNAR STROKE (HCC): ICD-10-CM

## 2024-08-20 PROCEDURE — 99212 OFFICE O/P EST SF 10 MIN: CPT

## 2024-08-20 PROCEDURE — 3078F DIAST BP <80 MM HG: CPT | Performed by: NURSE PRACTITIONER

## 2024-08-20 PROCEDURE — 3074F SYST BP LT 130 MM HG: CPT | Performed by: NURSE PRACTITIONER

## 2024-08-20 PROCEDURE — 99214 OFFICE O/P EST MOD 30 MIN: CPT | Performed by: NURSE PRACTITIONER

## 2024-08-20 ASSESSMENT — ENCOUNTER SYMPTOMS
SEIZURES: 0
PALPITATIONS: 1
TREMORS: 0
BLOOD IN STOOL: 0
HEADACHES: 0
BRUISES/BLEEDS EASILY: 0
CHILLS: 0
WEAKNESS: 0
FEVER: 0
SHORTNESS OF BREATH: 0
DIZZINESS: 0
COUGH: 0
DOUBLE VISION: 0
BLURRED VISION: 0
FOCAL WEAKNESS: 0

## 2024-08-20 ASSESSMENT — FIBROSIS 4 INDEX: FIB4 SCORE: 1.22

## 2024-08-20 NOTE — PROGRESS NOTES
"[facility] - Follow Up  08/20/2024   Chacho Salas is a 48 yo  male seen for eval/mgmt of HTN, est 1/2024  Chacho Salas is initially referred by Susie Chavez MD (PCP)    Subjective    Interval hx/concerns:   last seen 5/2024, feeling well,   Had full cardiac eval in ER in May due to \"fluttering\" feeling in his chest  Found not to be cardiac   Not seeing cardiology regularly   Denies any anxiety or panic attacks- he reports he is not an anxious person but stress could play a role   Home BP consistently <130/80  No longer taking HCTZ- he reports it was not reordered   Not pursuing any further treatment for sleep apnea  Feels better without CPAP after trying for 7 months  No recent labs   No s/s of CVA or TIA      Hyperlipidemia: Stable, no current concerns, Current treatment: lifestyle changes  and Moderate intensity rosuva 20mg     Antiplatelet/anticoagulation: Yes, Details: ASA , no bleeding noted         Current Outpatient Medications:     spironolactone, 25 mg, Oral, QDAY, Taking    amLODIPine, 10 mg, Oral, QHS, Taking    telmisartan, 80 mg, Oral, QHS, Taking    rosuvastatin, 20 mg, Oral, Q EVENING, Taking    aspirin EC, 81 mg, Oral, DAILY, Taking     Allergies   Allergen Reactions    Ace Inhibitors Cough    Chlorthalidone      Hypokalemia = 2.9, tolerating HCTZ     Social History     Tobacco Use    Smoking status: Never    Smokeless tobacco: Never   Vaping Use    Vaping status: Never Used   Substance Use Topics    Alcohol use: Not Currently     Comment: 3-4 days a week    Drug use: Yes     Comment: THC      Review of Systems   Constitutional:  Positive for malaise/fatigue. Negative for chills and fever.   Eyes:  Negative for blurred vision and double vision.   Respiratory:  Negative for cough and shortness of breath.    Cardiovascular:  Positive for palpitations. Negative for chest pain and leg swelling.   Gastrointestinal:  Negative for blood in stool and melena.   Genitourinary:  Negative for " "hematuria.   Neurological:  Negative for dizziness, tremors, focal weakness, seizures, weakness and headaches.   Endo/Heme/Allergies:  Does not bruise/bleed easily.       Objective      /79 (BP Location: Left arm, Patient Position: Sitting, BP Cuff Size: Adult)   Pulse (!) 55   Ht 1.727 m (5' 8\")   Wt 106 kg (233 lb)   BMI 35.43 kg/m²     Body mass index is 35.43 kg/m².  BP Readings from Last 5 Encounters:   08/20/24 114/79   05/20/24 118/78   05/09/24 108/76   03/07/24 105/68   01/16/24 120/79      Physical Exam  Vitals reviewed.   Constitutional:       General: He is not in acute distress.     Appearance: He is well-developed. He is not diaphoretic.   HENT:      Head: Normocephalic and atraumatic.   Cardiovascular:      Rate and Rhythm: Normal rate and regular rhythm.      Pulses: Normal pulses.      Heart sounds: No murmur heard.  Pulmonary:      Effort: No respiratory distress.      Breath sounds: Normal breath sounds. No wheezing or rales.   Musculoskeletal:         General: No swelling.      Right lower leg: No edema.      Left lower leg: No edema.   Skin:     General: Skin is warm and dry.      Coloration: Skin is not pale.   Neurological:      General: No focal deficit present.      Mental Status: He is oriented to person, place, and time.      Cranial Nerves: No cranial nerve deficit.      Coordination: Coordination normal.      Gait: Gait normal.   Psychiatric:         Mood and Affect: Mood normal.         Behavior: Behavior normal.        Accessory Clinical Findings:     Lab Results   Component Value Date    CHOLSTRLTOT 163 11/28/2023    LDL 99 11/28/2023    HDL 40 11/28/2023    TRIGLYCERIDE 120 11/28/2023      Lab Results   Component Value Date    SODIUM 132 (L) 05/20/2024    POTASSIUM 3.9 05/20/2024    CHLORIDE 100 05/20/2024    CO2 22 05/20/2024    GLUCOSE 101 (H) 05/20/2024    BUN 22 (H) 05/20/2024    CREATININE 1.1 05/20/2024       Latest Reference Range & Units 09/21/23 08:11   Cortisol " ug/dL 6.6   TSH 0.47 - 4.68 uIU/mL 2.12   Free T-4 0.78 - 2.19 ng/dL 1.37      Latest Reference Range & Units 01/17/24 08:25   Aldos Serum ng/dL 6   Renin Activity 0.25 - 5.82 ng/mL/h 1.88     Labs 1/17/24  METANEPHRINE, FREE      43               pg/mL        < OR = 57   NORMETANEPHRINE, FREE   185           H  pg/mL        < OR = 148   TOTAL, FREE (MN+NMN)    228           H  pg/mL        < OR = 205     VASCULAR IMAGING:    Carotid 2016  Minimal atherosclerotic disease is noted bilaterally, resulting in no hemodynamically significant carotid stenosis by sonographic criteria.     CT a/p 3/2023   Fatty liver.     CTA neck/head 5/2023  There is minimal atherosclerotic calcification involving carotid bulbs bilaterally.   Otherwise unremarkable CT angiogram of the neck, and [place] [facility] [place].   Incidental note is made of irregularity of vertebral endplates and disc space narrowing, from C5 through C7, which could represent a manifestation of previous trauma or infection.  Correlation with clinical history is suggested.    MR brain 6/2023  Remote lacunar infarction left basal ganglia.   5 mm focus of T2 hyperintensity involving right frontal deep white matter is most compatible with focal demyelination resulting from chronic microvascular atherosclerotic disease.    KATIE duplex 1/2024  Normal renal arterial study without evidence for renal artery stenosis.     Echo 1/2024        MEDICAL DECISION-MAKING:  TODAY'S ASSESSMENT / STATUS / PLAN:  1. Lacunar stroke (HCC)        2. Resistant hypertension  Comp Metabolic Panel      3. Other hyperlipidemia  Lipid Profile      4. Carotid atherosclerosis, bilateral          Patient Type: Primary Prevention, could consider secondary due to lacunar infarct    1. BLOOD PRESSURE CONTROL   Qualifies as Resistant HTN (RH):  No, not on optimized, max-dosed or max-tolerated meds from 3 classes   Office BP Goal ACC/AHA (2017) goal <130/80  Home BP at goal: yes  Office BP at goal:  yes  24h  ABPM:  not ordered to date   - stage 2, early age onset  - recommend to allow our HTN specialty clinic to manage BP med adjustments to avoid excessive changes and/or confusion with tx plan   BP under much improved control.  He denies any anxiety or panic attacks or history of either.    Plan:   Monitoring:   - start/continue home BP monitoring, reviewed correct technique:  Yes   - order 24h ABPM: UNDECIDED  - monitor lytes/gfr routinely     2. WORK UP FOR SECONDARY CAUSES OF RH:  Obstructive Sleep Apnea:  likely high contributor to mild high metaneph values   uncontrolled, unable to tolerate CPAP. No longer using.  He declines any further evaluation at this time and is aware of the long term complicated.       Renal parenchymal disease: Excluded  Renovascular HTN: Excluded    Primary Aldosteronism: low prob, non-suppressed renin, aretha <10, ARR low     Thyroid Function: Excluded    Pheochromocytoma:  minimal high total plasma <1xULN does not suggest pheo, but likely sympathetic-driven HTN, no further w/u at this time       Cushing's:   Excluded   Coarctation of Aorta: excluded per echo   Other:    1) obesity - strong contributor to HTN, continued wt loss         3. MEDICATION USE / ADHERENCE:   Assessment: Complete  Recommendations: Instructed to Continue Taking All Medications As Prescribed         4. HTN-MEDIATED END-ORGAN DAMAGE:  Left Ventricular Hypertrophy: Absent on  Echocardiogram Date: 2024  Urine Albuminuria: Not Yet Assessed on Date: ordered   Kidney function: G1-2 at worst   Ophthalmologic: Absent per patient report   Established Cardiovascular Disease:     # small vessel CeVD with lacunar infarct, left BG per MR brain 6/2023 - unclear chronicity, no sx.   2017 - acute L BG lacunar infarction with R hemiparesis, resolved   -presumed HTN-mediated, increased risk for future events   Plan: continue med mgmt, monitor for new acute focal neuro s/s and seek prompt attention     5. LIFESTYLE  INTERVENTIONS:    SMOKING:    reports that he has never smoked. He has never used smokeless tobacco.   - continued complete avoidance of all tobacco products     PHYSICAL ACTIVITY: continue healthy activity to improve CV fitness.  In general, targeting >150min/week of moderate-level activity or as much as tolerated in light of functional status and co-morbidities     WEIGHT MANAGEMENT AND NUTRITION: DASH style dietary approach , Focus on reduced sodium to <2,000mg per day , and Provide specific dietary approach handouts     6. STANDARD HTN PHARMACOTHERAPY: monitor lytes/gfr with med changes   - continue telmisartan 80mg, prior on valsartan   - continue amlodipine to 10mg daily   - continue HCTZ 25mg daily (max dosing, chlorthalidone led to severe hypoK+)     7. ADDITIONAL AGENTS   - continue spironolactone 25mg daily, increase   - add carvedilol or nebivolol as subsequent due to sympath-driven HTN  - add guanfacine QHS if 6th agent needed   - consider long-acting periph alpha next due to sympath-driven HTN   - stop hydralazine (prior on 25mg TID, only taking BID or intermittently)     8. Renal denervation therapy  - minimally invasive endovasc procedure targeting sympathetic nerves adjacent to renal aa  - current options include [facility] [place] uRDN system (may be available 2024 at [facility] or [employer] [facility] (not currently available)  - patient selection (for [place] uRDN): uncontrolled HTN, 21+years, eGFR >40, no renal artery stenosis  RDN candidate? Yes - if remains uncontrolled   Plan: will keep on candidate list for now and revisit if/when commercially available to our area     LIPID MANAGEMENT:   Qualifies for Statin Therapy Based on 2018 ACC/AHA Guidelines: yes, Secondary prevention - <74yo, ASCVD not at very high risk  The ASCVD Risk score ([place] DK, et al., 2019) failed to calculate., N/A  Major ASCVD events: History of ischemic CVA   (though lacunar)  High-risk conditions: Hypertension   Currently on  "Statin: Yes - should have high potency   Tx goals: LDL-C <70   At goal? No, 11/2023   Plan:   - reinforced ongoing TLC measures as noted   - monitor labs - check now  Meds:   - continue rosuva 20mg daily, prior on simva  - consider addition of zetia if LDL remains elevated     GLYCEMIA MANAGEMENT:  Normal, at risk     ANTIPLATELET/ANTICOAGULANT THERAPY:  continue ASA 81mg daily     OTHER ISSUES:   Anxiety - today pt reports this is not a problem.  He states he does not have anxiety or panic attacks.  BP is well controlled currently.     Chest heart \"flutter\" - after evaluation in ER with CP, no cardiac arrythmia or ACS or concern for PE.  No clear pathology defined.  Past zio patches have been negative.  He has been instructed to follow up with PCP and cards as an outpatient.  ER precautions discussed.     Studies Ordered At This Visit: none    Labs to Be Obtained Prior to Next Visit: as ordered above   Disposition: 6 months     BISI Ambrocio  [facility]   [facility]   [phone]    "

## 2024-09-24 DIAGNOSIS — I1A.0 RESISTANT HYPERTENSION: ICD-10-CM

## 2024-09-24 RX ORDER — EPLERENONE 25 MG/1
25 TABLET, FILM COATED ORAL DAILY
Qty: 100 TABLET | Refills: 3 | Status: SHIPPED | OUTPATIENT
Start: 2024-09-24

## 2024-09-24 NOTE — PROGRESS NOTES
Msg received by Dr. Alcantar regarding pt's gynecomastia (left sided) work up which has been negative for any malignant disease.  Recommend stopping spironolactone which will hopefully resolve the gynecomastia.  Left msg on patient's voicemail instructing him to stop spironolactone and in it's place start eplerenone which does not have the same side effect.  Advised it may take a few weeks for the symptoms to resolve.  Eplerenone ordered to his pharmacy on file.  Contact our office with any additional questions.    Jhoana Francis APRTATIANNA  [facility]